# Patient Record
Sex: MALE | Race: WHITE
[De-identification: names, ages, dates, MRNs, and addresses within clinical notes are randomized per-mention and may not be internally consistent; named-entity substitution may affect disease eponyms.]

---

## 2020-07-28 ENCOUNTER — HOSPITAL ENCOUNTER (OUTPATIENT)
Dept: HOSPITAL 47 - RADUSWWP | Age: 75
Discharge: HOME | End: 2020-07-28
Attending: INTERNAL MEDICINE
Payer: MEDICARE

## 2020-07-28 DIAGNOSIS — I65.29: Primary | ICD-10-CM

## 2020-07-28 PROCEDURE — 93880 EXTRACRANIAL BILAT STUDY: CPT

## 2020-07-29 NOTE — US
EXAMINATION TYPE: US carotid duplex BILAT

 

DATE OF EXAM: 7/28/2020

 

COMPARISON: NONE

 

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

 

EXAM MEASUREMENTS: 

 

RIGHT:  Peak Systolic Velocity (PSV) cm/sec

----- Right CCA:  108.8  

----- Right ICA:  92.9     

----- Right ECA:  75.4   

ICA/CCA ratio:  0.9    

 

RIGHT:  End Diastole cm/sec

----- Right CCA:  23.1   

----- Right ICA:  21.7      

----- Right ECA:  11.5     

 

LEFT:  Peak Systolic Velocity (PSV) cm/sec

----- Left CCA:  108.2  

----- Left ICA:  92.2   

----- Left ECA:  80.1  

ICA/CCA ratio:  0.9  

 

LEFT:  End Diastole cm/sec

----- Left CCA:  21.5  

----- Left ICA:  26.3   

----- Left ECA:  15.3 

 

VERTEBRALS (direction of flow):

Right Vertebral: Antegrade

Left Vertebral: Antegrade

 

Rhythm:  Arrhythmia as seen in image 23

 

Mild amount of plaque bilaterally. Elevated velocities in right and left CCA

 

 

 

IMPRESSION:  

1. Mild atheromatous plaquing without significant flow-limiting stenosis.

2. A dropped heartbeat appears to be present during this exam. Follow-up with EKG.   

 

 

Criteria for Assigning % of Stenosis / Diameter reduction

(Estimation based on the indirect measurements of the internal carotid artery velocities (ICA PSV).

1.  Normal (no stenosis)=ICA PSV < 125 cm/s: ratio < 2.0: ICA EDV<40 cm/s.

2. Less than 50% stenosis=ICA PSV < 125 cm/s: ratio < 2.0: ICA EDV<40 cm/s.

3.  50 to 69% stenosis=ICA PSV of 125 to 230 cm/s: ration 2.0 ? 4.0: ICA EDV  cm/s.

4.  Greater than 70% stenosis to near occlusion= ICA PSV > 230 cm/s: ratio > 4.0: ICA EDV > 100 cm/s.
 

5.  Near occlusion= ICA PSV velocities may be low or undetectable: variable ratio and ICA EDV.

6.  Total occlusion=unable to detect flow.

 

 

 

 

 

A Orange level critical message alert has been initiated for Danny Muller via the [facility]
tical Results System on 7/29/2020 10:49 AM.  This message alert has been sent to Danny Muller via the p
references provided by the clinician for the receipt of Radiology Critical Findings. Message ID [id number]
[id number].

## 2023-04-13 ENCOUNTER — HOSPITAL ENCOUNTER (OUTPATIENT)
Dept: HOSPITAL 47 - RADUSWWP | Age: 78
Discharge: HOME | End: 2023-04-13
Attending: PODIATRIST
Payer: MEDICARE

## 2023-04-13 DIAGNOSIS — R60.0: Primary | ICD-10-CM

## 2023-04-13 PROCEDURE — 93970 EXTREMITY STUDY: CPT

## 2023-04-13 NOTE — US
EXAMINATION TYPE: US venous doppler duplex LE BI

 

DATE OF EXAM: 4/13/2023 1:19 PM

 

COMPARISON: NONE

 

CLINICAL HISTORY: R60.0 EDEMA. Rt leg edema

 

SIDE PERFORMED: Bilateral  

 

TECHNIQUE:  The lower extremity deep venous system is examined utilizing real time linear array sonog
sarwat with graded compression, doppler sonography and color-flow sonography.

 

VESSELS IMAGED:

Common Femoral Vein

Deep Femoral Vein

Greater Saphenous Vein *

Femoral Vein

Popliteal Vein

Small Saphenous Vein *

Proximal Calf Veins

(* superficial vessels)

 

 

 

Right Leg:  Negative for DVT

 

Left Leg:  Negative for DVT

 

 

 

IMPRESSION:

1. Bilateral lower extremity ultrasound negative for deep venous thrombosis.